# Patient Record
Sex: MALE | Race: ASIAN | URBAN - METROPOLITAN AREA
[De-identification: names, ages, dates, MRNs, and addresses within clinical notes are randomized per-mention and may not be internally consistent; named-entity substitution may affect disease eponyms.]

---

## 2018-06-03 ENCOUNTER — OFFICE VISIT (OUTPATIENT)
Dept: URGENT CARE | Facility: URGENT CARE | Age: 53
End: 2018-06-03

## 2018-06-03 VITALS
WEIGHT: 153.4 LBS | SYSTOLIC BLOOD PRESSURE: 110 MMHG | HEIGHT: 65 IN | HEART RATE: 60 BPM | DIASTOLIC BLOOD PRESSURE: 82 MMHG | RESPIRATION RATE: 16 BRPM | OXYGEN SATURATION: 99 % | BODY MASS INDEX: 25.56 KG/M2

## 2018-06-03 DIAGNOSIS — J20.9 ACUTE BRONCHITIS, UNSPECIFIED ORGANISM: Primary | ICD-10-CM

## 2018-06-03 PROCEDURE — 99203 OFFICE O/P NEW LOW 30 MIN: CPT | Performed by: FAMILY MEDICINE

## 2018-06-03 RX ORDER — CODEINE PHOSPHATE AND GUAIFENESIN 10; 100 MG/5ML; MG/5ML
SOLUTION ORAL
Qty: 120 ML | Refills: 0 | Status: SHIPPED | OUTPATIENT
Start: 2018-06-03 | End: 2018-06-08

## 2018-06-03 NOTE — MR AVS SNAPSHOT
"              After Visit Summary   6/3/2018    Sonia Gomez    MRN: [id number]           Patient Information     Date Of Birth          1965        Visit Information        Provider Department      6/3/2018 12:10 PM Raul Weber,  [facility]        Today's Diagnoses     Acute bronchitis, unspecified organism    -  1       Follow-ups after your visit        Who to contact     If you have questions or need follow up information about today's clinic visit or your schedule please contact [facility] directly at [phone].  Normal or non-critical lab and imaging results will be communicated to you by MyChart, letter or phone within 4 business days after the clinic has received the results. If you do not hear from us within 7 days, please contact the clinic through MyChart or phone. If you have a critical or abnormal lab result, we will notify you by phone as soon as possible.  Submit refill requests through [employer] or call your pharmacy and they will forward the refill request to us. Please allow 3 business days for your refill to be completed.          Additional Information About Your Visit        Care EveryWhere ID     This is your Care EveryWhere ID. This could be used by other organizations to access your [facility] medical records  [id number]        Your Vitals Were     Pulse Respirations Height Pulse Oximetry BMI (Body Mass Index)       60 16 5' 4.96\" (1.65 m) 99% 25.56 kg/m2        Blood Pressure from Last 3 Encounters:   06/03/18 110/82    Weight from Last 3 Encounters:   06/03/18 153 lb 6.4 oz (69.6 kg)              Today, you had the following     No orders found for display         Today's Medication Changes          These changes are accurate as of 6/3/18 12:29 PM.  If you have any questions, ask your nurse or doctor.               Start taking these medicines.        Dose/Directions    guaiFENesin-codeine 100-10 MG/5ML Soln solution "   Commonly known as:  ROBITUSSIN AC   Used for:  Acute bronchitis, unspecified organism        1-2 tsp po q 4-6hrs prn cough   Quantity:  120 mL   Refills:  0            Where to get your medicines      Some of these will need a paper prescription and others can be bought over the counter.  Ask your nurse if you have questions.     Bring a paper prescription for each of these medications     guaiFENesin-codeine 100-10 MG/5ML Soln solution                Primary Care Provider Fax #    Physician No Ref-Primary [phone]       No address on file        Equal Access to Services     [facility]: Hadii aad ku hadasho Soomaali, waaxda luqadaha, qaybta kaalmada adeegyada, jacob tom haycalin adesherice parson . So [facility] [phone].    ATENCIÓN: Si habla español, tiene a menchaca disposición servicios gratuitos de asistencia lingüística. Llame al [phone].    We comply with applicable federal civil rights laws and [place] laws. We do not discriminate on the basis of race, color, national origin, age, disability, sex, sexual orientation, or gender identity.            Thank you!     Thank you for choosing [facility]  for your care. Our goal is always to provide you with excellent care. Hearing back from our patients is one way we can continue to improve our services. Please take a few minutes to complete the written survey that you may receive in the mail after your visit with us. Thank you!             Your Updated Medication List - Protect others around you: Learn how to safely use, store and throw away your medicines at www.disposemymeds.org.          This list is accurate as of 6/3/18 12:29 PM.  Always use your most recent med list.                   Brand Name Dispense Instructions for use Diagnosis    guaiFENesin-codeine 100-10 MG/5ML Soln solution    ROBITUSSIN AC    120 mL    1-2 tsp po q 4-6hrs prn cough    Acute bronchitis, unspecified organism

## 2018-06-03 NOTE — LETTER
[facility]  [address] [address]  [phone]      Anamaria 3, 2018    RE:  Sonia Gomez                                                                                                                                                       [id number] [place] [address]            To whom it may concern:    Sonia Gomez is under my professional care for Medical.    He  may return to work with the following: No working or lifting restrictions on or about 6-4-18.          Sincerely,        Raul Weber    [facility]

## 2018-06-03 NOTE — PROGRESS NOTES
"SUBJECTIVE: Sonia Gomez is a 52 year old male presenting with a chief complaint of nasal congestion and cough .  Onset of symptoms was 2 day(s) ago.  Course of illness is worsening.    Severity moderate  Current and Associated symptoms: runny nose and sore throat  Treatment measures tried include Tylenol/Ibuprofen.  Predisposing factors include None.    No past medical history on file.  No Known Allergies  Social History   Substance Use Topics     Smoking status: Not on file     Smokeless tobacco: Not on file     Alcohol use Not on file       ROS:  SKIN: no rash  GI: no vomiting    OBJECTIVE:  /82  Pulse 60  Resp 16  Ht 5' 4.96\" (1.65 m)  Wt 153 lb 6.4 oz (69.6 kg)  SpO2 99%  BMI 25.56 kg/r5UMCQCVV APPEARANCE: healthy, alert and no distress  EYES: EOMI,  PERRL, conjunctiva clear  HENT: ear canals and TM's normal.  Nose and mouth without ulcers, erythema or lesions  NECK: supple, nontender, no lymphadenopathy  RESP: lungs clear to auscultation - no rales, rhonchi or wheezes  SKIN: no suspicious lesions or rashes      ICD-10-CM    1. Acute bronchitis, unspecified organism J20.9 guaiFENesin-codeine (ROBITUSSIN AC) 100-10 MG/5ML SOLN solution       Fluids/Rest, f/u if worse/not any better    "